# Patient Record
Sex: MALE | Race: BLACK OR AFRICAN AMERICAN | NOT HISPANIC OR LATINO | URBAN - METROPOLITAN AREA
[De-identification: names, ages, dates, MRNs, and addresses within clinical notes are randomized per-mention and may not be internally consistent; named-entity substitution may affect disease eponyms.]

---

## 2022-01-17 ENCOUNTER — EMERGENCY (EMERGENCY)
Facility: HOSPITAL | Age: 62
LOS: 0 days | Discharge: HOME | End: 2022-01-17
Attending: EMERGENCY MEDICINE
Payer: SELF-PAY

## 2022-01-17 VITALS
TEMPERATURE: 98 F | HEIGHT: 72 IN | RESPIRATION RATE: 16 BRPM | HEART RATE: 92 BPM | WEIGHT: 169.98 LBS | OXYGEN SATURATION: 95 % | DIASTOLIC BLOOD PRESSURE: 68 MMHG | SYSTOLIC BLOOD PRESSURE: 128 MMHG

## 2022-01-17 DIAGNOSIS — E11.9 TYPE 2 DIABETES MELLITUS WITHOUT COMPLICATIONS: ICD-10-CM

## 2022-01-17 DIAGNOSIS — J45.909 UNSPECIFIED ASTHMA, UNCOMPLICATED: ICD-10-CM

## 2022-01-17 DIAGNOSIS — R21 RASH AND OTHER NONSPECIFIC SKIN ERUPTION: ICD-10-CM

## 2022-01-17 DIAGNOSIS — B86 SCABIES: ICD-10-CM

## 2022-01-17 PROCEDURE — 99053 MED SERV 10PM-8AM 24 HR FAC: CPT

## 2022-01-17 PROCEDURE — 99283 EMERGENCY DEPT VISIT LOW MDM: CPT

## 2022-01-17 RX ORDER — DIPHENHYDRAMINE HCL 50 MG
50 CAPSULE ORAL ONCE
Refills: 0 | Status: COMPLETED | OUTPATIENT
Start: 2022-01-17 | End: 2022-01-17

## 2022-01-17 RX ORDER — PERMETHRIN CREAM 5% W/W 50 MG/G
1 CREAM TOPICAL ONCE
Refills: 0 | Status: COMPLETED | OUTPATIENT
Start: 2022-01-17 | End: 2022-01-17

## 2022-01-17 RX ADMIN — PERMETHRIN CREAM 5% W/W 1 APPLICATION(S): 50 CREAM TOPICAL at 05:52

## 2022-01-17 RX ADMIN — Medication 50 MILLIGRAM(S): at 05:35

## 2022-01-17 NOTE — ED ADULT NURSE NOTE - DRUG PRE-SCREENING (DAST -1)
HPI:  53 year old M HTN and ETOH abuse brought in by wife for generalized weakness and dark urine.  Wife reports patient has been having non-bloody non-bilious vomiting last week which stopped his drinking on Friday 5/3.  Wife also notes he has been having easy bruising and L leg swelling over the same amount of time.  Upon further questioning, patient reports having increasing bruising because he works as a super, carrying stuff up and down stairs.  Wife subsequently added that he has been having weakness and fatigue since early March with episodes of gum bleeding going back to early January.  Of note, wife noted that his skin color has changed over the past week.  Pt denies fevers, chills, eye pain vision changes, (08 May 2019 23:01)  -------------------- As Above ----------------------------------------------------------------------------  The patient presented with L leg pain and weakness. Patient does state that over the past few weeks he was passing dark colored urin.  IN ER, HGB was 2.4  The patient denies melena, hematochezia, hematemesis, nausea, vomiting, abdominal pain, constipation, diarrhea, or change in bowel movements No new meds. Never had a colonoscopy.     1) Anemia - Probably secondary to hemolysis. Will hold off on EGD / colonoscopy at present time. Patient on steroids.  Continue as per hematology  2) Elevated LFTs - bilirubin out of proportion to other results. Probably secondary to hemolysis. Patient may have underlying liver disease ( ETOH abuse ). Will need paracentesis  3) Abnormal CT scan  - stomach / colon - will need EGD / colon in near future Statement Selected

## 2022-01-17 NOTE — ED PROVIDER NOTE - PATIENT PORTAL LINK FT
You can access the FollowMyHealth Patient Portal offered by [facility] by registering at the following website: [email or web address]. By joining [email or web address]’s FollowMyHealth portal, you will also be able to view your health information using other applications (apps) compatible with our system.

## 2022-01-17 NOTE — ED PROVIDER NOTE - SKIN, MLM
Skin normal color for race, warm, dry and intact. 1-3 mm elevated papules over the trunk and chest in lines.

## 2022-01-17 NOTE — ED ADULT NURSE REASSESSMENT NOTE - NS ED NURSE REASSESS COMMENT FT1
Pt dc'd by MD. Refused to leave several times. Agitated, verbally abusing staff. Given food and 2 metrocards, still refused to leave. Security called, [employer] called.

## 2022-01-17 NOTE — ED ADULT NURSE NOTE - OBJECTIVE STATEMENT
61 year old male, Aox4 hx of DM, asthma, BIBA c/o muscle spasms. pt states the spasms are "all over my body. PT also has itchy rash over his chest and back for the last 3 days. NO fever, no chills, no vomiting, no abd pain, no chest papain, no sob, no swelling in his mouth.

## 2022-01-17 NOTE — ED PROVIDER NOTE - NS ED ATTENDING STATEMENT MOD
Physical Therapy  Visit Type: initial evaluation  Precautions:  Medical precautions:  fall risk; standard precautions.    Lines:     Basic: IV  Hearing: no hearing deficits  Vision:     Current vision: no visual deficits  Safety Measures: bed alarm and sitter      SUBJECTIVE                                                                                                            Patient agreed to participate in therapy this date.  Prior treatment in the past year for same condition: inpatient PT and subacute rehabilitionPatient has been hospitalized or in a skilled nursing facility in the last 30 days.      OBJECTIVE                                                                                                                Oriented to person     Arousal alertness: appropriate responses to stimuli    Affect/Behavior: alert, cooperative and preoccupied  Patient activity tolerance: 1 to 1 activity to rest  Functional Communication/Cognition    Overall status:  Within functional limits  Range of Motion (measured in degrees unless otherwise noted, active unless indicated)  WFL: RLE, LLE  Strength (out of 5 unless otherwise indicated)   Comments / Details:  BLE's - gross strength of 3/5  Balance    Sitting: Static: independent, Dynamic: independent    Standing - Firm Surface - Eyes Open: Static: minimal assist  Dynamic: moderate assist  Balance Training Completed    Training Tasks: static sitting, dynamic sitting, static standing and dynamic standing    Using: eyes open and even surfaces    Education Details: patient safety and patient requires additional training  Balance Details: Patient needs to be redirected on the task at hand frequently. Has poor standing tolerance and dynamic standing balance  Neurological Comments / Details: BLE's - intact light touch  Bed Mobility:      Rolling right: moderate assist    Supine to sit: moderate assist    Sit to supine: moderate assist  Training completed:    Tasks: supine to  sit, sit to supine and rolling right    Education details: patient safety and patient requires additional training  Transfers:    Assistive devices: 2-wheeled walker    Sit to stand: minimal assist    Stand to sit: minimal assist  Training completed:    Tasks: sit to stand and stand to sit    Education details: patient safety and patient requires additional training  Gait/Ambulation:     Assistance: moderate assist, with verbal cues and with tactile cues   Assistive device: 2-wheeled walker    Distance (ft): 4    Pattern: step to (walked sideways towards HOB; constant vc and tactile cues to the leg is needed)    Type: unsteady    Swing phase: Left: decreased step length, decreased hip flexion in swing and decreased knee flexion; Right: decreased step length, decreased hip flexion in swing and decreased knee flexion  Training Completed:    Tasks: gait training on level surfaces and assistive device use    Education details: patient safety and patient requires additional training        Interventions                                                                                                       Supine    Lower Extremity: Bilateral: ankle pumps and SLR, AROM, 5 reps, 1 sets  Skilled input: Verbal instruction/cues        ASSESSMENT                                                                                                                Impairments: strength, balance deficits, safety awareness, activity tolerance and cognition  Functional Limitations: bed mobility, stand pivot transfers and ambulation     Discharge Recommendations   Recommendation for Discharge: PT IL: Patient needs daily, skilled therapy for 1-3 hours a day by at least two disciplines                   Skilled therapy is required to address these limitations in attempt to maximize the patient's independence.  Predicted patient presentation: Moderate (evolving) - Patient comorbidities and complexities, as defined above, may have varying impact  on steady progress for prescribed plan of care.      End of Session:   Location: in bed  Safety measures: alarm system in place/re-engaged, call light within reach and sitter present  Handoff to: nurse            PLAN                                                                                                                            Suggestions for next session as indicated: PT Frequency: 3 days/week  Frequency Comments: seen 9/5/20; P3 - 1    Interventions: bed mobility, endurance training, gait training, functional transfer training, ROM, strengthening, safety education and balance  Agreement to plan and goals: patient agrees with goals and treatment plan        GOALS:  Long Term Goals: (to be met by time of discharge from hospital)  Sit to supine: Patient will complete sit to supine supervision.  Supine to sit: Patient will complete supine to sit supervision.  Stand (even surface): Patient will stand on even surface for with use of RW, 2mins, contact guard or touching/steadying.   Sit to stand: Patient will complete sit to stand transfer with 2-wheeled walker, contact guard or touching/steadying.   Stand to sit: Patient will complete stand to sit transfer with 2-wheeled walker, contact guard or touching/steadying.   Ambulation (even): Patient will ambulate on even surface for 50 feet with 2-wheeled walker, contact guard or touching/steadying.       Documented in the chart in the following areas: Prior Level of Function. Pain. Assessment. Patient Education.      Referring Provider: No ref. provider found  Admitting diagnosis: Lactic acidosis (E87.2);Weakness (R53.1);Hypotension, unspecified hypotension type (I95.9)    Recorded hospital problem list:  There are no active hospital problems to display for this patient.  Additional Co-morbidities:   There is no problem list on file for this patient.      The referring provider's electronic signature on the evaluation authorizes the therapy plan of care and  certifies the need for these services, furnished under this plan of care while under their care.           Attending Only

## 2022-01-17 NOTE — ED ADULT TRIAGE NOTE - LOCATION:
Contacted Mom with a negative COVID test no further treatment or restrictions at this time  
Left arm;

## 2022-01-17 NOTE — ED ADULT TRIAGE NOTE - NS ED TRIAGE AVPU SCALE
Alert-The patient is alert, awake and responds to voice. The patient is oriented to time, place, and person. The triage nurse is able to obtain subjective information.
no pain, swelling or deformity of joints

## 2022-01-17 NOTE — ED ADULT NURSE NOTE - EXTENSIONS OF SELF_ADULT
[FreeTextEntry1] : \par Limit carbohydrates in diet\par Advocated dilated retinal exam yearly\par Proper foot care reviewed\par \par Goal blood pressure /90\par Not at goal but in pain today\par Urged to limit sodium intake\par Urged to maintain diet and exercise habits to keep BMI < 28\par Limit alcohol consumption to < 7 drinks per week\par Reinforced adherence to medication regimen\par \par 34 minutes spent in preparation of this visit, including review of previous notes and test results, interview and examination of patient, discussion of plan, arranging for appropriate testing and treatment, and documentation.\par  None

## 2023-09-28 NOTE — ED PROVIDER NOTE - OBJECTIVE STATEMENT
VITAL SIGNS: I have reviewed nursing notes and confirm.  CONSTITUTIONAL: 41 yo F laying on stretcher.  SKIN: Skin exam is warm and dry, no acute rash.  HEAD: Normocephalic; atraumatic.  EYES: PERRL, EOM intact; conjunctiva and sclera clear.  ENT: No nasal discharge; airway clear.  CARD: S1, S2 normal; no murmurs, gallops, or rubs. Regular rate and rhythm.  RESP: No wheezes, rales or rhonchi. Speaking in full sentences.   ABD: Normal bowel sounds; soft; non-distended; non-tender; No rebound or guarding. No CVA tenderness.  EXT: Normal ROM.   NEURO: Somnolent, easily arousable, alert/oriented. Grossly unremarkable. No focal deficits.   PSYCH: Cooperative. Denies SI/HI. 61 year old male came to the ED because of an itchy rash over his chest and back for the last 3 days. NO fever, no chills, no vomiting, no abd pain, no chest papain, no sob, no swelling in his mouth.